# Patient Record
Sex: MALE | Race: WHITE | Employment: FULL TIME | URBAN - METROPOLITAN AREA
[De-identification: names, ages, dates, MRNs, and addresses within clinical notes are randomized per-mention and may not be internally consistent; named-entity substitution may affect disease eponyms.]

---

## 2017-09-18 ENCOUNTER — HOSPITAL ENCOUNTER (OUTPATIENT)
Dept: PREADMISSION TESTING | Age: 62
Discharge: HOME | End: 2017-09-18
Payer: COMMERCIAL

## 2017-09-18 VITALS
OXYGEN SATURATION: 96 % | WEIGHT: 190.04 LBS | HEIGHT: 68 IN | HEART RATE: 80 BPM | DIASTOLIC BLOOD PRESSURE: 70 MMHG | RESPIRATION RATE: 16 BRPM | SYSTOLIC BLOOD PRESSURE: 114 MMHG | BODY MASS INDEX: 28.8 KG/M2 | TEMPERATURE: 98.3 F

## 2017-09-18 LAB
ANION GAP SERPL CALC-SCNC: 6 MMOL/L (ref 5–15)
ATRIAL RATE: 75 BPM
BUN SERPL-MCNC: 8 MG/DL (ref 6–20)
BUN/CREAT SERPL: 10 (ref 12–20)
CALCIUM SERPL-MCNC: 9.3 MG/DL (ref 8.5–10.1)
CALCULATED P AXIS, ECG09: 10 DEGREES
CALCULATED R AXIS, ECG10: -23 DEGREES
CALCULATED T AXIS, ECG11: -8 DEGREES
CHLORIDE SERPL-SCNC: 100 MMOL/L (ref 97–108)
CO2 SERPL-SCNC: 30 MMOL/L (ref 21–32)
CREAT SERPL-MCNC: 0.83 MG/DL (ref 0.7–1.3)
DIAGNOSIS, 93000: NORMAL
GLUCOSE SERPL-MCNC: 118 MG/DL (ref 65–100)
P-R INTERVAL, ECG05: 146 MS
POTASSIUM SERPL-SCNC: 4.6 MMOL/L (ref 3.5–5.1)
Q-T INTERVAL, ECG07: 380 MS
QRS DURATION, ECG06: 92 MS
QTC CALCULATION (BEZET), ECG08: 424 MS
SODIUM SERPL-SCNC: 136 MMOL/L (ref 136–145)
VENTRICULAR RATE, ECG03: 75 BPM

## 2017-09-18 PROCEDURE — 36415 COLL VENOUS BLD VENIPUNCTURE: CPT | Performed by: ANESTHESIOLOGY

## 2017-09-18 PROCEDURE — 93005 ELECTROCARDIOGRAM TRACING: CPT

## 2017-09-18 PROCEDURE — 80048 BASIC METABOLIC PNL TOTAL CA: CPT | Performed by: ANESTHESIOLOGY

## 2017-09-18 RX ORDER — AMLODIPINE BESYLATE 5 MG/1
5 TABLET ORAL
COMMUNITY

## 2017-09-18 RX ORDER — METOPROLOL TARTRATE 25 MG/1
25 TABLET, FILM COATED ORAL 2 TIMES DAILY
COMMUNITY

## 2017-09-18 RX ORDER — CLOPIDOGREL BISULFATE 75 MG/1
75 TABLET ORAL
COMMUNITY

## 2017-09-18 RX ORDER — METFORMIN HYDROCHLORIDE 500 MG/1
500 TABLET, EXTENDED RELEASE ORAL 2 TIMES DAILY
COMMUNITY

## 2017-09-18 RX ORDER — ROSUVASTATIN CALCIUM 20 MG/1
20 TABLET, COATED ORAL
COMMUNITY

## 2017-09-18 NOTE — PERIOP NOTES
[facility]  PREOPERATIVE INSTRUCTIONS    Surgery Date:   9/22/2017     Surgery arrival time given by surgeon: NO  (If [facility] staff will call you between 3pm - 7pm the day before surgery with your arrival time. If your surgery is on a Monday, we will call you the preceding Friday. Please call [phone] after 7pm if you did not receive your arrival time.)  1. Report  to the 2nd Floor Admitting Desk on the day of your surgery. Bring your insurance card, photo identification, and any copayment (if applicable). 2. You must have a responsible adult to drive you home and stay with you the first 24 hours after surgery if you are going home the same day of your surgery. 3. Nothing to eat or drink after midnight the night before surgery. This means NO water, gum, mints, coffee, juice, etc.    4. MEDICATIONS TO TAKE THE MORNING OF SURGERY WITH A SIP OF WATER:Amlodipine, Metoprolol, Rosuvastatin. Hold your Metformin the day of surgery only. Contact your PCP to see what dose to use for Levemir the night before surgery. Contact your cardiologist asap to find out when to stop and restart your Plavix. 5. No alcoholic beverages 24 hours before and after your surgery. 6. If you are being admitted to the hospital,please leave personal belongings/luggage in your car until you have an assigned hospital room number. ( The hospital discharge time is 12 PM NOON. Your adult  should be at the hospital prior to the noon discharge time unless otherwise instructed.)   7. STOP Aspirin and/or any non-steroidal anti-inflammatory drugs (i.e. Ibuprofen, Naproxen, Advil, Aleve) as directed by your surgeon. You may take Tylenol. Stop herbal supplements 1 week prior to  surgery. 8. If you are currently taking Plavix, Coumadin,or any other blood-thinning/ anticoagulant medication contact your surgeon for instructions. 9. Wear comfortable clothes. Wear your glasses instead of contacts.  Please leave all money, jewelry and valuables at home. No make up, particularly mascara, the day of surgery. 10.  REMOVE ALL body piercings, rings,and jewelry and leave at home. Wear your hair loose or down, no pony-tails, buns, or any metal hair clips. 11. If you shower the morning of surgery, please do not apply any lotions, powders, or deodorants afterwards. Do not shave any body area within 24 hours of your surgery. 12. Please follow all instructions to avoid any potential surgical cancellation. 13. Should your physical condition change, (i.e. fever, cold, flu, etc.) please notify your surgeon as soon as possible. 14. It is important to be on time. If a situation occurs where you may be delayed, please call:  [phone] / [phone] on the day of surgery. 15. The [facility] staff can be reached at [phone]. 16. Special instructions: free  parking available 7-5  17. The patient was contacted  in person. He  verbalize  understanding of all instructions does not  need reinforcement.

## 2017-09-20 NOTE — PERIOP NOTES
Left VM at Dr. Bower [facility] office to request Plavix plan faxed to their office on 9/18/17 be faxed back to PATNatali RAMIREZ 9/22/17.

## 2017-09-21 ENCOUNTER — ANESTHESIA EVENT (OUTPATIENT)
Dept: SURGERY | Age: 62
End: 2017-09-21
Payer: COMMERCIAL

## 2017-09-21 NOTE — PERIOP NOTES
Orders for PAT and surgery were requested before the patient came through PAT and have not been received as of yet. Spoke with Warden Shepherd at Dr. Raya Hidalgo office today requesting orders for surgery.   DOS: 9/22/2017

## 2017-09-22 ENCOUNTER — HOSPITAL ENCOUNTER (OUTPATIENT)
Age: 62
Setting detail: OUTPATIENT SURGERY
Discharge: HOME | End: 2017-09-22
Attending: UROLOGY | Admitting: UROLOGY
Payer: COMMERCIAL

## 2017-09-22 ENCOUNTER — ANESTHESIA (OUTPATIENT)
Dept: SURGERY | Age: 62
End: 2017-09-22
Payer: COMMERCIAL

## 2017-09-22 VITALS
SYSTOLIC BLOOD PRESSURE: 119 MMHG | OXYGEN SATURATION: 95 % | WEIGHT: 198.41 LBS | TEMPERATURE: 98.2 F | HEART RATE: 86 BPM | HEIGHT: 68 IN | RESPIRATION RATE: 15 BRPM | BODY MASS INDEX: 30.07 KG/M2 | DIASTOLIC BLOOD PRESSURE: 60 MMHG

## 2017-09-22 PROBLEM — A63.0 PENILE VENEREAL WARTS: Status: ACTIVE | Noted: 2017-09-22

## 2017-09-22 LAB
GLUCOSE BLD STRIP.AUTO-MCNC: 120 MG/DL (ref 65–100)
SERVICE CMNT-IMP: ABNORMAL

## 2017-09-22 PROCEDURE — 77030011943: Performed by: UROLOGY

## 2017-09-22 PROCEDURE — 76210000050 HC AMBSU PH II REC 0.5 TO 1 HR: Performed by: UROLOGY

## 2017-09-22 PROCEDURE — 74011000250 HC RX REV CODE- 250: Performed by: UROLOGY

## 2017-09-22 PROCEDURE — 74011250636 HC RX REV CODE- 250/636

## 2017-09-22 PROCEDURE — 74011250636 HC RX REV CODE- 250/636: Performed by: ANESTHESIOLOGY

## 2017-09-22 PROCEDURE — 74011000250 HC RX REV CODE- 250

## 2017-09-22 PROCEDURE — 76060000062 HC AMB SURG ANES 1 TO 1.5 HR: Performed by: UROLOGY

## 2017-09-22 PROCEDURE — 77030027138 HC INCENT SPIROMETER -A

## 2017-09-22 PROCEDURE — 77030020143 HC AIRWY LARYN INTUB CGAS -A: Performed by: ANESTHESIOLOGY

## 2017-09-22 PROCEDURE — 82962 GLUCOSE BLOOD TEST: CPT

## 2017-09-22 PROCEDURE — 74011250636 HC RX REV CODE- 250/636: Performed by: UROLOGY

## 2017-09-22 PROCEDURE — 76030000019 HC AMB SURG 1 TO 1.5 HR INTENSV-TIER 1: Performed by: UROLOGY

## 2017-09-22 PROCEDURE — 77030018846 HC SOL IRR STRL H20 ICUM -A: Performed by: UROLOGY

## 2017-09-22 PROCEDURE — 77030020782 HC GWN BAIR PAWS FLX 3M -B

## 2017-09-22 RX ORDER — CEPHALEXIN 500 MG/1
500 CAPSULE ORAL 4 TIMES DAILY
Qty: 20 CAP | Refills: 0 | Status: SHIPPED | OUTPATIENT
Start: 2017-09-22 | End: 2017-09-27

## 2017-09-22 RX ORDER — SODIUM CHLORIDE 0.9 % (FLUSH) 0.9 %
5-10 SYRINGE (ML) INJECTION EVERY 8 HOURS
Status: DISCONTINUED | OUTPATIENT
Start: 2017-09-22 | End: 2017-09-22 | Stop reason: HOSPADM

## 2017-09-22 RX ORDER — SODIUM CHLORIDE 0.9 % (FLUSH) 0.9 %
5-10 SYRINGE (ML) INJECTION AS NEEDED
Status: DISCONTINUED | OUTPATIENT
Start: 2017-09-22 | End: 2017-09-22 | Stop reason: HOSPADM

## 2017-09-22 RX ORDER — PROPOFOL 10 MG/ML
INJECTION, EMULSION INTRAVENOUS AS NEEDED
Status: DISCONTINUED | OUTPATIENT
Start: 2017-09-22 | End: 2017-09-22 | Stop reason: HOSPADM

## 2017-09-22 RX ORDER — ONDANSETRON 2 MG/ML
INJECTION INTRAMUSCULAR; INTRAVENOUS AS NEEDED
Status: DISCONTINUED | OUTPATIENT
Start: 2017-09-22 | End: 2017-09-22 | Stop reason: HOSPADM

## 2017-09-22 RX ORDER — SODIUM CHLORIDE, SODIUM LACTATE, POTASSIUM CHLORIDE, CALCIUM CHLORIDE 600; 310; 30; 20 MG/100ML; MG/100ML; MG/100ML; MG/100ML
100 INJECTION, SOLUTION INTRAVENOUS CONTINUOUS
Status: DISCONTINUED | OUTPATIENT
Start: 2017-09-22 | End: 2017-09-22 | Stop reason: HOSPADM

## 2017-09-22 RX ORDER — HYDROMORPHONE HYDROCHLORIDE 1 MG/ML
.25-1 INJECTION, SOLUTION INTRAMUSCULAR; INTRAVENOUS; SUBCUTANEOUS
Status: DISCONTINUED | OUTPATIENT
Start: 2017-09-22 | End: 2017-09-22 | Stop reason: HOSPADM

## 2017-09-22 RX ORDER — LIDOCAINE HYDROCHLORIDE 10 MG/ML
0.1 INJECTION, SOLUTION EPIDURAL; INFILTRATION; INTRACAUDAL; PERINEURAL AS NEEDED
Status: DISCONTINUED | OUTPATIENT
Start: 2017-09-22 | End: 2017-09-22 | Stop reason: HOSPADM

## 2017-09-22 RX ORDER — CEFAZOLIN SODIUM IN 0.9 % NACL 2 G/50 ML
2 INTRAVENOUS SOLUTION, PIGGYBACK (ML) INTRAVENOUS ONCE
Status: COMPLETED | OUTPATIENT
Start: 2017-09-22 | End: 2017-09-22

## 2017-09-22 RX ORDER — OXYCODONE AND ACETAMINOPHEN 5; 325 MG/1; MG/1
1-2 TABLET ORAL
Qty: 40 TAB | Refills: 0 | Status: SHIPPED | OUTPATIENT
Start: 2017-09-22

## 2017-09-22 RX ORDER — ONDANSETRON 2 MG/ML
4 INJECTION INTRAMUSCULAR; INTRAVENOUS AS NEEDED
Status: DISCONTINUED | OUTPATIENT
Start: 2017-09-22 | End: 2017-09-22 | Stop reason: HOSPADM

## 2017-09-22 RX ORDER — MIDAZOLAM HYDROCHLORIDE 1 MG/ML
INJECTION, SOLUTION INTRAMUSCULAR; INTRAVENOUS AS NEEDED
Status: DISCONTINUED | OUTPATIENT
Start: 2017-09-22 | End: 2017-09-22 | Stop reason: HOSPADM

## 2017-09-22 RX ORDER — DIPHENHYDRAMINE HYDROCHLORIDE 50 MG/ML
12.5 INJECTION, SOLUTION INTRAMUSCULAR; INTRAVENOUS AS NEEDED
Status: DISCONTINUED | OUTPATIENT
Start: 2017-09-22 | End: 2017-09-22 | Stop reason: HOSPADM

## 2017-09-22 RX ORDER — FENTANYL CITRATE 50 UG/ML
INJECTION, SOLUTION INTRAMUSCULAR; INTRAVENOUS AS NEEDED
Status: DISCONTINUED | OUTPATIENT
Start: 2017-09-22 | End: 2017-09-22 | Stop reason: HOSPADM

## 2017-09-22 RX ORDER — BUPIVACAINE HYDROCHLORIDE 5 MG/ML
INJECTION, SOLUTION EPIDURAL; INTRACAUDAL AS NEEDED
Status: DISCONTINUED | OUTPATIENT
Start: 2017-09-22 | End: 2017-09-22 | Stop reason: HOSPADM

## 2017-09-22 RX ORDER — PROMETHAZINE HYDROCHLORIDE 12.5 MG/1
25 TABLET ORAL
Qty: 10 TAB | Refills: 0 | Status: SHIPPED | OUTPATIENT
Start: 2017-09-22 | End: 2017-10-02

## 2017-09-22 RX ORDER — SODIUM CHLORIDE, SODIUM LACTATE, POTASSIUM CHLORIDE, CALCIUM CHLORIDE 600; 310; 30; 20 MG/100ML; MG/100ML; MG/100ML; MG/100ML
125 INJECTION, SOLUTION INTRAVENOUS CONTINUOUS
Status: DISCONTINUED | OUTPATIENT
Start: 2017-09-22 | End: 2017-09-22 | Stop reason: HOSPADM

## 2017-09-22 RX ORDER — BACITRACIN ZINC 500 UNIT/G
OINTMENT (GRAM) TOPICAL AS NEEDED
Status: DISCONTINUED | OUTPATIENT
Start: 2017-09-22 | End: 2017-09-22 | Stop reason: HOSPADM

## 2017-09-22 RX ADMIN — MIDAZOLAM HYDROCHLORIDE 2 MG: 1 INJECTION, SOLUTION INTRAMUSCULAR; INTRAVENOUS at 08:14

## 2017-09-22 RX ADMIN — PROPOFOL 170 MG: 10 INJECTION, EMULSION INTRAVENOUS at 08:21

## 2017-09-22 RX ADMIN — CEFAZOLIN 2 G: 1 INJECTION, POWDER, FOR SOLUTION INTRAMUSCULAR; INTRAVENOUS; PARENTERAL at 08:19

## 2017-09-22 RX ADMIN — FENTANYL CITRATE 25 MCG: 50 INJECTION, SOLUTION INTRAMUSCULAR; INTRAVENOUS at 08:53

## 2017-09-22 RX ADMIN — PROPOFOL 30 MG: 10 INJECTION, EMULSION INTRAVENOUS at 08:54

## 2017-09-22 RX ADMIN — SODIUM CHLORIDE, SODIUM LACTATE, POTASSIUM CHLORIDE, AND CALCIUM CHLORIDE 100 ML/HR: 600; 310; 30; 20 INJECTION, SOLUTION INTRAVENOUS at 06:36

## 2017-09-22 RX ADMIN — SODIUM CHLORIDE, SODIUM LACTATE, POTASSIUM CHLORIDE, AND CALCIUM CHLORIDE: 600; 310; 30; 20 INJECTION, SOLUTION INTRAVENOUS at 09:09

## 2017-09-22 RX ADMIN — FENTANYL CITRATE 50 MCG: 50 INJECTION, SOLUTION INTRAMUSCULAR; INTRAVENOUS at 08:14

## 2017-09-22 RX ADMIN — FENTANYL CITRATE 25 MCG: 50 INJECTION, SOLUTION INTRAMUSCULAR; INTRAVENOUS at 08:54

## 2017-09-22 RX ADMIN — ONDANSETRON 4 MG: 2 INJECTION INTRAMUSCULAR; INTRAVENOUS at 09:13

## 2017-09-22 NOTE — DISCHARGE INSTRUCTIONS
[facility]  urology  [phone] [phone]  [place]  f  [phone]    PATIENT INSTRUCTIONS:    After general anesthesia or intravenous sedation, for 24 hours or while taking prescription Narcotics:  · Limit your activities  · Do not drive and operate hazardous machinery  · Do not make important personal or business decisions  · Do not drink alcoholic beverages      Please contact [facility] at [phone] if:  · Temperature over 101  · Vomiting / Unable to tolerate liquids  · Severe pain not responsive to pain medications  · Unable to urinate for 6-8 hours  · Problems with wound - bleeding, redness, discharge    Apply Neosporin to wounds at least 2 x / day  May shower in the AM            DISCHARGE SUMMARY from Your Nurse    PATIENT INSTRUCTIONS:    AFTER ANESTHESIA & SEDATION, and WHILE TAKING PAIN MEDICINE  After general anesthesia / intravenous sedation and the 24 hours following, and/or while taking prescription Opiates:  · Limit your activities  · Do not drive and operate hazardous machinery until you have been of all narcotics and sedatives for over 24 hours  · Do not make important personal or business decisions  · Do  not drink alcoholic beverages  · If you have not urinated within 8 hours after discharge, please contact your surgeon on call. SIGNS OF INFECTION  Report the following Signs of Infection or General Problems after surgery to your surgeon:  · Excessive pain, swelling, redness or odor of or around the surgical area  · Temperature over 101; Temperature over 100 if on medications that affect your ability to fight infections  · Nausea and vomiting lasting longer than 4 hours or if unable to take medications  · Any signs of decreased circulation or nerve impairment to extremity: change in color, persistent  numbness, tingling, coldness or increase pain  · If you have any questions.       COUGH AND DEEP BREATHE  Breathing deep and coughing are very important exercises to do after surgery. Deep breathing and coughing open the little air tubes and air sacks in your lungs. You take deep breaths every day. You may not even notice - it is just something you do when you sigh or yawn. It is a natural exercise you do to keep these air passages open. After surgery, take deep breaths and cough, on purpose. Coughing and deep breathing help prevent bronchitis and pneumonia after surgery. If you had chest or belly surgery, use a pillow as a \"hug patience\" and hold it tightly to your chest or belly when you cough. DIRECTIONS:  1. Take 10 to 15 slow deep breaths every hour while awake. 2. Breathe in deeply, and hold it for 2 seconds. 3. Exhale slowly through puckered lips, like blowing up a balloon. 4. After every 4th or 5th deep breath, hug your pillow to your chest or belly and give a hard, deep cough. Yes, it will probably hurt. But doing this exercise is very important part of healing after surgery. Take your pain medicine to help you do this exercise without too much pain. ANKLE PUMPS    Ankle pumps increase the circulation of oxygenated blood to your lower extremities and decrease your risk for circulation problems such as blood clots. They also stretch the muscles, tendons and ligaments in your foot and ankle, and prevent joint contracture in the ankle and foot, especially after surgeries on the legs. It is important to do ankle pump exercises regularly after surgery because immobility increases your risk for developing a blood clot. Your doctor may also have you take an Aspirin for the next few days as well. If your doctor did not ask you to take an Aspirin, consult with him before starting Aspirin therapy on your own. The exercise is quite simple. · Slowly point your foot forward, feeling the muscles on the top of your lower leg stretch, and hold this position for 5 seconds.                   · Next, pull your foot back toward you as far as possible, stretching the calf muscles, and hold that position for 5 seconds. · Repeat with the other foot. · Perform 10 repetitions every hour while awake for both ankles if possible (down and then up with the foot once is one repetition). You should feel gentle stretching of the muscles in your lower leg when doing this exercise. If you feel pain, or your range of motion is limited, don't push too hard. Only go the limit your joint and muscles will let you go. If you have increasing pain, progressively worsening leg warmth or swelling, STOP the exercise and call your doctor. Other Wound Care information:                        Below is information on the medication(s) your doctor is prescribing for you: The maximum daily dose of acetaminophen was discussed with the patient. He was encouraged not to exceed 3,000 mg of acetaminophen during a 24 hour period and was asked to keep in mind that acetaminophen can also be found in many over-the-counter cold medications as well as narcotics that may be given for pain. The patient expresses understanding of these issues and questions were answered. 4 THINGS ABOUT PAIN MEDICINE I ALWAYS TALK ABOUT:  There are 4 side effects I always talk about for pain medications. 1. They make you sleepy and drowsy. Do not drive a car or operate machines while taking pain medication. Do not make any major decisions. Take a nap. Relax. Let your body recover from the affects of anesthesia and surgery. 2. Some people have quite a problem with itching and. ..  3. Nausea or vomiting. I mention these together because research tends to suggest there is a gene-related issue. While some have a hard time with these problems, others do not. These are expected and know side effects. Over-the-counter Benadryl® (the drug name is diphenhydramine) can help with the itching.   Your doctor may also have given you some medicine for nausea. IF HE DID NOT, CALL HIM/HER. 4. Last but not least is the problem of constipation (not serenity able to have a bowel movement - poop.)  All pain medicine can slow down the movement of food through the gut. The slower it goes, the worse it can be. Frankly, this means hard poop adding insult to the injury of surgery. And if you had tummy surgery, like having your gall bladder removed or a hernia repair, YOU DO NOT WANT THIS PROBLEM. There are 4 things I recommend. · Drink lots of fluids. For healthy people with no heart problems, this means at least 64 ounces of liquids or more per day. For example, a Big Gulp® from [employer] is 32 ounces. So you need to drink at least 2  Big Gulp®'s of fluids every day. If you have heart problems you may not be able to do this. Talk to your doctor about what you should do to prevent constipation. · Drinking fruit juice like apple, pear, or prune juice gives you extra \"BANG\" for your beverage. These drinks are high in natural fiber. If you are a diabetic, drink sugar-free fluids with fiber additives (see next 2 points.)  Avoid drinking extra fruit juice unless this is a regular part of your diet plan. · Eat extra fresh fruits and vegetables. · Add extra fiber-products. Fiber products like Metamucil®, Citrucel®, Miralax® or Benefiber® can help. These products are over-the-counter and you do not need a prescription from your doctor. If you have followed these recommendations and still have some difficulty having a good poop, take and over-the-counter stimulant like Dulcolax® (biscodyl)  or Senokot® (senna concentrate). These may help get things moving. Nicholas Wing MEDICATION AND   SIDE EFFECT GUIDE    The Boone Matos MEDICATION AND SIDE EFFECT GUIDE was provided to the PATIENT AND CARE PROVIDER.   Information provided includes instruction about drug purpose and common side effects for the following medications: · PERCOCET  · PHENERGAN  · KEFLEX        Medication information added to discharge record on September 22, 2017 at 9:24 AM.      Some information we wish all of our patients to be familiar with and General Information for Healthy Lifestyle choices:    · Make a list of your current medications with your Primary Care Provider. · Update this list whenever your medications are discontinued, doses are changed, or new medications (including over-the-counter products like ibuprofen, vitamins, or herbal remedies) are added. · Carry medication information at all times in case of emergency situations      No smoking / No tobacco products / Avoid exposure to second hand smoke    Surgeon General's Warning:  Quitting smoking now greatly reduces serious risk to your health. Obesity, smoking, and sedentary lifestyle greatly increases your risk for illness. A healthy diet, regular physical exercise & weight monitoring are important for maintaining a healthy lifestyle. A Note About Congestive Heart Failure: You may be retaining fluid if you have a history of heart failure or if you experience any of the following symptoms:      · Weight gain of 3 pounds or more overnight or 5 pounds in a week  · Increased swelling in our hands or feet  · Shortness of breath while lying flat in bed      Please call your doctor as soon as you notice any of these symptoms; do not wait until your next office visit. A Note About Strokes:  Recognize signs and symptoms of STROKE. The simple mnemonic, F.A.S.T., can help you remember signs of a stroke and what to do if you suspect a stroke is occuring to you or someone you are with:    F - Face looks uneven  A - Arms unable to move, or move evenly  S - Speech is slurred or non-existent  T - Time - CALL 911 as soon as signs and symptoms begin - DO NOT go to bed or wait to see if you get better - TIME IS BRAIN.     Warning Signs of HEART ATTACK   Call 911 if you have these symptoms:     Chest discomfort. Most heart attacks involve discomfort in the center of the chest that lasts more than a few minutes, or that goes away and comes back. It can feel like uncomfortable pressure, squeezing, fullness, or pain.  Discomfort in other areas of the upper body. Symptoms can include pain or discomfort in one or both arms, the back, neck, jaw, or stomach.  Shortness of breath with or without chest discomfort.  Other signs may include breaking out in a cold sweat, nausea, or lightheadedness. Don't wait more than five minutes to call 911 - MINUTES MATTER! Fast action can save your life. Calling 911 is almost always the fastest way to get lifesaving treatment. Emergency Medical Services staff can begin treatment when they arrive -- up to an hour sooner than if someone gets to the hospital by car. AT THE COMPLETION OF DISCHARGE INSTRUCTION REVIEW, WE VERIFY:  The discharge information has been reviewed with the patient and caregiver. Questions have been asked and answered meeting patient and caregiver expectations. The patient and caregiver verbalized understanding. Your discharge nurse was Donna GONZALEZ, RN-BC       Board Certified - Pain Management      Other information found in your discharge envelope:  [x]     PRESCRIPTIONS     [] Sent electronically to your pharmacy  []     PHYSICAL THERAPY PRESCRIPTION  []     APPOINTMENT CARDS  []     Regional Anesthesia Pamphlet for block or block with On-Q Catheter from Anesthesia  Service  []     Medical device information sheets/pamphlets from their    []     School/work excuse note. []     /parent work excuse note. The following personal items collected during your admission for safe keeping are returned to you:     Dental Appliance: Dental Appliances: None  Vi trevor:    Hearing Aid:    Jewelry:    Clothing: Clothing:  (denvinay valua les. clothing to locker)  Other Valuables:    Valuables sent to safe:

## 2017-09-22 NOTE — PERIOP NOTES
PACU IN REPORT FROM ANESTHESIA    Verbal report received from   [address] [place] [address]  following General for Procedure(s) (LRB):  CO2 LASER FULGERATION OF PENILE WARTS   (N/A) by  Tiffany Dumont MD.    Note the anesthesia record for medications given intraoperatively. Brief Initial Visual Assessment:    Patient is an: [] Infant(1-12mo)   [] Toddler(1-3yr)     [] (3-5yr)                     [] School-Age(5-13yr) [] Adolescent(13-18yr)  [x] Adult(18-65yr)                         [] Geriatric Adult(>65yr). Patient is:    [] SEDATED      [x] DROWSY      [] ALERT          [] ANXIOUS [x] CALM & COOPERATIVE     Alert and Oriented x 3. Airway is:     [x] Patent                          [] Near-obstructed                          [] Obstructed          [] Improved with head/airway repositioning. Airway Adjuncts Present: [] Oral Airway       [] Nasal Trumpet       [] ETT     Respiratory  [x] Even    [x] Non-labored.     [] Labored   [] Shallow  pattern is:    [] VENT and/or respiratory assistance being provided. Skin is:     [x] Pink [] Pale       [x] Warm [] Cool [] Cold   [x] Dry [] Moist [] Diaphoretic     Membranes   [x] Pink [] Pale       [x] Moist [] Dry [] Crusty     Patient is in:  [x] No Acute Discomfort. 0 /10 pain Scale  [] A.N.V.   [] Moderate Discomfort.      [] V.A.S. [] Acute Discomfort. [x] Verbal Numeric   Note E-MAR for medications administered.  [] F.L.A.C.C. Note assessments documented in flowsheets; any assessment variants to be found in comments or narrative perioperative nurse notes.        Post-anesthesia care now assumed by [facility] BSN, RN-BC

## 2017-09-22 NOTE — ANESTHESIA PREPROCEDURE EVALUATION
Anesthetic History   No history of anesthetic complications            Review of Systems / Medical History  Patient summary reviewed and pertinent labs reviewed    Pulmonary        Sleep apnea: CPAP  Smoker         Neuro/Psych   Within defined limits           Cardiovascular    Hypertension          Past MI, CAD and cardiac stents    Exercise tolerance: >4 METS     GI/Hepatic/Renal  Within defined limits              Endo/Other    Diabetes: type 2, using insulin         Other Findings              Physical Exam    Airway  Mallampati: III  TM Distance: 4 - 6 cm  Neck ROM: normal range of motion   Mouth opening: Normal     Cardiovascular    Rhythm: regular  Rate: normal         Dental    Dentition: Caps/crowns     Pulmonary  Breath sounds clear to auscultation               Abdominal         Other Findings            Anesthetic Plan    ASA: 3  Anesthesia type: general            Anesthetic plan and risks discussed with: Patient

## 2017-09-22 NOTE — ANESTHESIA POSTPROCEDURE EVALUATION
Post-Anesthesia Evaluation and Assessment    Patient: Ashlie Smith MRN: [id number]  SSN: [id number]    YOB: 1955  Age: 60 y.o. Sex: male       Cardiovascular Function/Vital Signs  Visit Vitals    /60    Pulse 86    Temp 36.8 °C (98.2 °F)    Resp 15    Ht 5' 8\" (1.727 m)    Wt 90 kg (198 lb 6.6 oz)    SpO2 95%    BMI 30.17 kg/m2       Patient is status post general anesthesia for Procedure(s):  CO2 LASER FULGERATION OF PENILE WARTS  . Nausea/Vomiting: None    Postoperative hydration reviewed and adequate. Pain:  Pain Scale 1: Numeric (0 - 10) (09/22/17 0955)  Pain Intensity 1: 0 (09/22/17 0955)   Managed    Neurological Status:   Neuro (WDL): Within Defined Limits (09/22/17 0955)   At baseline    Mental Status and Level of Consciousness: Arousable    Pulmonary Status:   O2 Device: Room air (09/22/17 7344)   Adequate oxygenation and airway patent    Complications related to anesthesia: None    Post-anesthesia assessment completed.  No concerns    Signed By: Michelle Mondragon MD     September 22, 2017

## 2017-09-22 NOTE — PROGRESS NOTES
POST ANESTHESIA CARE    DISCHARGE / TRANSFER NOTE    Víctor Robertson was   discharged     via    wheel chair     to  private vehicle    . Patient was escorted by  spouse   . Patient verbalized   appreciation and was very pleased with care received  throughout their stay. Patient was discharged in     pleasant mood   . Pain at discharge/transfer was    1 /10. Discharge, medication and follow-up instructions were verbalized as understood prior to discharge  (if applicable for same-day procedures being discharged.)    All personal belongings have been returned to patient, and patient/family verbally confirm receiving belongings as all present. John MERAZN RN-BC Clin. IV

## 2017-09-22 NOTE — BRIEF OP NOTE
BRIEF OPERATIVE NOTE    Date of Procedure: 9/22/2017   Preoperative Diagnosis: PENILE WARTS  Postoperative Diagnosis: PENILE WARTS    Procedure(s):  CO2 LASER FULGERATION OF PENILE WARTS    Surgeon(s) and Role:     * Avila Moore MD - Primary         Assistant Staff:       Surgical Staff:  Circ-1: Yves Davis RN  Scrub [facility]  Event Time In   Incision Start 2292   Incision Close 0910     Anesthesia: General   Estimated Blood Loss: < 50cc  Specimens: * No specimens in log *   Findings: extensive condyloma involving entire shaft   Complications: none  Implants: * No implants in log *

## 2017-09-22 NOTE — IP AVS SNAPSHOT
[address] [place] [address] 
[phone] Patient: Oly Hanks MRN: [id number] OLT:[id number] You are allergic to the following No active allergies Recent Documentation Height Weight BMI Smoking Status 1.727 m 90 kg 30.17 kg/m2 Current Every Day Smoker Emergency Contacts Name Discharge Info Relation Home Work Mobile [facility] DISCHARGE CAREGIVER [3] Spouse [3] [phone] About your hospitalization You were admitted on:  September 22, 2017 You last received care in the:  [facility] 2 AMB SURG UNIT You were discharged on:  September 22, 2017 Unit phone number:  [phone] Why you were hospitalized Your primary diagnosis was:  Penile Venereal Warts Providers Seen During Your Hospitalizations Provider Role Specialty Primary office phone Tatyana Manning MD Attending Provider Urology [phone] Your Primary Care Physician (PCP) Primary Care Physician Office Phone Office Fax Mo Easley [phone] Follow-up Information Follow up With Details Comments Contact Info Tatyana Manning MD Schedule an appointment as soon as possible for a visit in 2 week(s)  [address] [facility] 
[phone] Sj Price MD   [address] [facility] [address] [facility] [address] 
[phone] Current Discharge Medication List  
  
START taking these medications Dose & Instructions Dispensing Information Comments Morning Noon Evening Bedtime  
 cephALEXin 500 mg capsule Commonly known as:  Lorenda Darting Your last dose was: Your next dose is:    
   
   
 Dose:  500 mg Take 1 Cap by mouth four (4) times daily for 5 days. Quantity:  20 Cap Refills:  0  
     
   
   
   
  
 oxyCODONE-acetaminophen 5-325 mg per tablet Commonly known as:  PERCOCET  
   
 Your last dose was: Your next dose is:    
   
   
 Dose:  1-2 Tab Take 1-2 Tabs by mouth every four (4) hours as needed for Pain. Max Daily Amount: 12 Tabs. Quantity:  40 Tab Refills:  0  
     
   
   
   
  
 promethazine 12.5 mg tablet Commonly known as:  PHENERGAN Your last dose was: Your next dose is:    
   
   
 Dose:  25 mg Take 2 Tabs by mouth every six (6) hours as needed for Nausea for up to 10 days. Quantity:  10 Tab Refills:  0 CONTINUE these medications which have NOT CHANGED Dose & Instructions Dispensing Information Comments Morning Noon Evening Bedtime  
 amLODIPine 5 mg tablet Commonly known as:  Unknown [place] Your last dose was: Your next dose is:    
   
   
 Dose:  5 mg Take 5 mg by mouth every morning. Refills:  0 BYDUREON 2 mg Serr Generic drug:  exenatide microspheres Your last dose was: Your next dose is:    
   
   
 Dose:  2 mg  
2 mg by SubCUTAneous route every seven (7) days. Refills:  0  
     
   
   
   
  
 clopidogrel 75 mg Tab Commonly known as:  PLAVIX Your last dose was: Your next dose is:    
   
   
 Dose:  75 mg Take 75 mg by mouth every morning. Refills:  0 LEVEMIR FLEXTOUCH 100 unit/mL (3 mL) Inpn Generic drug:  insulin detemir Your last dose was: Your next dose is:    
   
   
 Dose:  60 Units 60 Units by SubCUTAneous route nightly. Refills:  0  
     
   
   
   
  
 metFORMIN  mg tablet Commonly known as:  GLUCOPHAGE XR Your last dose was: Your next dose is:    
   
   
 Dose:  500 mg Take 500 mg by mouth two (2) times a day. Refills:  0  
     
   
   
   
  
 metoprolol tartrate 25 mg tablet Commonly known as:  LOPRESSOR Your last dose was: Your next dose is:    
   
   
 Dose:  25 mg Take 25 mg by mouth two (2) times a day. Refills:  0  
     
   
   
   
  
 rosuvastatin 20 mg tablet Commonly known as:  CRESTOR Your last dose was: Your next dose is:    
   
   
 Dose:  20 mg Take 20 mg by mouth every morning. Refills:  0 Where to Get Your Medications Information on where to get these meds will be given to you by the nurse or doctor. ! Ask your nurse or doctor about these medications  
  cephALEXin 500 mg capsule  
 oxyCODONE-acetaminophen 5-325 mg per tablet  
 promethazine 12.5 mg tablet Discharge Instructions   
  
[place] 
urology [address] [facility] [address] [phone] 
[facility] 6[address], [address] [facility]  f  [phone] PATIENT INSTRUCTIONS: 
 
After general anesthesia or intravenous sedation, for 24 hours or while taking prescription Narcotics: · Limit your activities · Do not drive and operate hazardous machinery · Do not make important personal or business decisions · Do not drink alcoholic beverages Please contact [facility] at [phone] if: · Temperature over 101 · Vomiting / Unable to tolerate liquids · Severe pain not responsive to pain medications · Unable to urinate for 6-8 hours · Problems with wound - bleeding, redness, discharge Apply Neosporin to wounds at least 2 x / day May shower in the AM 
 
 
 
 
 
DISCHARGE SUMMARY from Your Nurse PATIENT INSTRUCTIONS: 
 
AFTER ANESTHESIA & SEDATION, and WHILE TAKING PAIN MEDICINE After general anesthesia / intravenous sedation and the 24 hours following, and/or while taking prescription Opiates: · Limit your activities · Do not drive and operate hazardous machinery until you have been of all narcotics and sedatives for over 24 hours · Do not make important personal or business decisions · Do  not drink alcoholic beverages · If you have not urinated within 8 hours after discharge, please contact your surgeon on call.  
 
 
SIGNS OF INFECTION 
 Report the following Signs of Infection or General Problems after surgery to your surgeon: 
· Excessive pain, swelling, redness or odor of or around the surgical area · Temperature over 101; Temperature over 100 if on medications that affect your ability to fight infections · Nausea and vomiting lasting longer than 4 hours or if unable to take medications · Any signs of decreased circulation or nerve impairment to extremity: change in color, persistent  numbness, tingling, coldness or increase pain · If you have any questions. [address] Breathing deep and coughing are very important exercises to do after surgery. Deep breathing and coughing open the little air tubes and air sacks in your lungs. You take deep breaths every day. You may not even notice - it is just something you do when you sigh or yawn. It is a natural exercise you do to keep these air passages open. After surgery, take deep breaths and cough, on purpose. Coughing and deep breathing help prevent bronchitis and pneumonia after surgery. If you had chest or belly surgery, use a pillow as a \"hug patience\" and hold it tightly to your chest or belly when you cough. DIRECTIONS: 
1. Take 10 to 15 slow deep breaths every hour while awake. 2. Breathe in deeply, and hold it for 2 seconds. 3. Exhale slowly through puckered lips, like blowing up a balloon. 4. After every 4th or 5th deep breath, hug your pillow to your chest or belly and give a hard, deep cough. Yes, it will probably hurt. But doing this exercise is very important part of healing after surgery. Take your pain medicine to help you do this exercise without too much pain. ANKLE PUMPS Ankle pumps increase the circulation of oxygenated blood to your lower extremities and decrease your risk for circulation problems such as blood clots.  They also stretch the muscles, tendons and ligaments in your foot and ankle, and prevent joint contracture in the ankle and foot, especially after surgeries on the legs. It is important to do ankle pump exercises regularly after surgery because immobility increases your risk for developing a blood clot. Your doctor may also have you take an Aspirin for the next few days as well. If your doctor did not ask you to take an Aspirin, consult with him before starting Aspirin therapy on your own. The exercise is quite simple. · Slowly point your foot forward, feeling the muscles on the top of your lower leg stretch, and hold this position for 5 seconds. · Next, pull your foot back toward you as far as possible, stretching the calf muscles, and hold that position for 5 seconds. · Repeat with the other foot. · Perform 10 repetitions every hour while awake for both ankles if possible (down and then up with the foot once is one repetition). You should feel gentle stretching of the muscles in your lower leg when doing this exercise. If you feel pain, or your range of motion is limited, don't push too hard. Only go the limit your joint and muscles will let you go. If you have increasing pain, progressively worsening leg warmth or swelling, STOP the exercise and call your doctor. Other Wound Care information: 
 
 
 
               
Below is information on the medication(s) your doctor is prescribing for you: The maximum daily dose of acetaminophen was discussed with the patient. He was encouraged not to exceed 3,000 mg of acetaminophen during a 24 hour period and was asked to keep in mind that acetaminophen can also be found in many over-the-counter cold medications as well as narcotics that may be given for pain. The patient expresses understanding of these issues and questions were answered. 4 THINGS ABOUT PAIN MEDICINE I ALWAYS TALK ABOUT: 
There are 4 side effects I always talk about for pain medications. 1. They make you sleepy and drowsy. Do not drive a car or operate machines while taking pain medication. Do not make any major decisions. Take a nap. Relax. Let your body recover from the affects of anesthesia and surgery. 2. Some people have quite a problem with itching and. .. 3. Nausea or vomiting. I mention these together because research tends to suggest there is a gene-related issue. While some have a hard time with these problems, others do not. These are expected and know side effects. Over-the-counter Benadryl® (the drug name is diphenhydramine) can help with the itching. Your doctor may also have given you some medicine for nausea. IF HE DID NOT, CALL HIM/HER. 4. Last but not least is the problem of constipation (not serenity able to have a bowel movement - poop.)  All pain medicine can slow down the movement of food through the gut. The slower it goes, the worse it can be. Frankly, this means hard poop adding insult to the injury of surgery. And if you had tummy surgery, like having your gall bladder removed or a hernia repair, YOU DO NOT WANT THIS PROBLEM. There are 4 things I recommend. · Drink lots of fluids. For healthy people with no heart problems, this means at least 64 ounces of liquids or more per day. For example, a Big Gulp® from [employer] is 32 ounces. So you need to drink at least 2  Big Gulp®'s of fluids every day. If you have heart problems you may not be able to do this. Talk to your doctor about what you should do to prevent constipation. · Drinking fruit juice like apple, pear, or prune juice gives you extra \"BANG\" for your beverage. These drinks are high in natural fiber. If you are a diabetic, drink sugar-free fluids with fiber additives (see next 2 points.)  Avoid drinking extra fruit juice unless this is a regular part of your diet plan. · Eat extra fresh fruits and vegetables. · Add extra fiber-products.   Fiber products like Lina Valentin, Miralax® or Benefiber® can help. These products are over-the-counter and you do not need a prescription from your doctor. If you have followed these recommendations and still have some difficulty having a good poop, take and over-the-counter stimulant like Dulcolax® (biscodyl)  or Senokot® (senna concentrate). These may help get things moving. [address] EFFECT GUIDE The [address] EFFECT GUIDE was provided to the PATIENT AND CARE PROVIDER. Information provided includes instruction about drug purpose and common side effects for the following medications:  
· PERCOCET · PHENERGAN 
· Moy Kleine Medication information added to discharge record on September 22, 2017 at 9:24 AM. Some information we wish all of our patients to be familiar with and General Information for Healthy Lifestyle choices: · Make a list of your current medications with your Primary Care Provider. · Update this list whenever your medications are discontinued, doses are changed, or new medications (including over-the-counter products like ibuprofen, vitamins, or herbal remedies) are added. · Carry medication information at all times in case of emergency situations No smoking / No tobacco products / Avoid exposure to second hand smoke Surgeon General's Warning:  Quitting smoking now greatly reduces serious risk to your health. Obesity, smoking, and sedentary lifestyle greatly increases your risk for illness. A healthy diet, regular physical exercise & weight monitoring are important for maintaining a healthy lifestyle. A Note About Congestive Heart Failure: You may be retaining fluid if you have a history of heart failure or if you experience any of the following symptoms:   
 
· Weight gain of 3 pounds or more overnight or 5 pounds in a week · Increased swelling in our hands or feet · Shortness of breath while lying flat in bed Please call your doctor as soon as you notice any of these symptoms; do not wait until your next office visit. A Note About Strokes: 
Recognize signs and symptoms of STROKE. The simple mnemonic, F.A.S.T., can help you remember signs of a stroke and what to do if you suspect a stroke is occuring to you or someone you are with: 
 
F - Face looks uneven A - Arms unable to move, or move evenly S - Speech is slurred or non-existent T - Time - CALL 911 as soon as signs and symptoms begin - DO NOT go to bed or wait to see if you get better - TIME IS BRAIN. Warning Signs of HEART ATTACK Call 911 if you have these symptoms: 
 
? Chest discomfort. Most heart attacks involve discomfort in the center of the chest that lasts more than a few minutes, or that goes away and comes back. It can feel like uncomfortable pressure, squeezing, fullness, or pain. ? Discomfort in other areas of the upper body. Symptoms can include pain or discomfort in one or both arms, the back, neck, jaw, or stomach. ? Shortness of breath with or without chest discomfort. ? Other signs may include breaking out in a cold sweat, nausea, or lightheadedness. Don't wait more than five minutes to call [address]! Fast action can save your life. Calling 911 is almost always the fastest way to get lifesaving treatment. Emergency Medical Services staff can begin treatment when they arrive  up to an hour sooner than if someone gets to the hospital by car. AT THE COMPLETION OF DISCHARGE INSTRUCTION REVIEW, WE VERIFY: 
The discharge information has been reviewed with the patient and caregiver. Questions have been asked and answered meeting patient and caregiver expectations. The patient and caregiver verbalized understanding. Your discharge nurse was Aureliano MERAZN, RN-BC Board Certified - Pain Management Other information found in your discharge envelope: PRESCRIPTIONS      Sent electronically to your pharmacy PHYSICAL THERAPY PRESCRIPTION 
     APPOINTMENT CARDS [facility] Pamphlet for block or block with On-Q Catheter from Anesthesia  Service Medical device information sheets/pamphlets from their  School/work excuse note. /parent work excuse note. The following personal items collected during your admission for safe keeping are returned to you:  
 
Dental Appliance: Dental Appliances: None Vi trevor:   
Hearing Aid:   
Jewelry:   
Clothing: Clothing:  (denies valua les. clothing to locker) Other Valuables:   
Valuables sent to safe:   
 
 
Discharge Orders None Introducing [facility]! Akanksha Garcia introduces [employer] patient portal. Now you can access parts of your medical record, email your doctor's office, and request medication refills online. 1. In your internet browser, go to [email or web address] 2. Click on the First Time User? Click Here link in the Sign In box. You will see the New Member Sign Up page. 3. Enter your [employer] Access Code exactly as it appears below. You will not need to use this code after youve completed the sign-up process. If you do not sign up before the expiration date, you must request a new code. · [employer] Access Code: Yves [email or web address] Expires: 12/17/2017  7:48 AM 
 
4. Enter the last four digits of your Social Security Number (xxxx) and Date of Birth (mm/dd/yyyy) as indicated and click Submit. You will be taken to the next sign-up page. 5. Create a [employer] ID. This will be your [employer] login ID and cannot be changed, so think of one that is secure and easy to remember. 6. Create a [employer] password. You can change your password at any time. 7. Enter your Password Reset Question and Answer. This can be used at a later time if you forget your password. 8. Enter your e-mail address.  You will receive e-mail notification when new information is available in [employer]. 9. Click Sign Up. You can now view and download portions of your medical record. 10. Click the Download Summary menu link to download a portable copy of your medical information. If you have questions, please visit the Frequently Asked Questions section of the [employer] website. Remember, [employer] is NOT to be used for urgent needs. For medical emergencies, dial 911. Now available from your iPhone and Android! General Information Please provide this summary of care documentation to your next provider. Patient Signature:  ____________________________________________________________ Date:  ____________________________________________________________  
  
[facility] Provider Signature:  ____________________________________________________________ Date:  ____________________________________________________________

## 2017-09-25 NOTE — OP NOTES
[facility]   [address] [facility], [address]   OP NOTE       Name:  Kallie Ventura   MR#:  [id number]   :  1955   Account #:  [de-identified]    Surgery Date:  2017   Date of Adm:  2017       DATE OF OPERATION: 2017. PREOPERATIVE DIAGNOSIS: Extensive penile warts. POSTOPERATIVE DIAGNOSIS: Extensive penile warts. PROCEDURES PERFORMED: CO2 laser vaporization of extensive   penile warts. SURGEON: Ernie Jimenez MD.    ANESTHESIA: General using LMA. SPECIMENS: None. COMPLICATIONS: None. ESTIMATED BLOOD LOSS: None. DRAINS: None. INDICATION FOR PROCEDURE: The patient is a very pleasant 60  year-old white male with a history of extensive penile condyloma who   has undergone previous CO2 laser vaporization for these. He now   presents for repeat laser vaporization. Of note, he has condyloma   involving at least 50% of the skin surface area of the penile shaft,   extending 3/4 of the way around the shaft itself. There is no evidence   of any of these undergoing any malignant change. INFORMED CONSENT: After detailed discussion of the procedure, his   options, and its pros and cons, the patient has consented to proceed   with laser vaporization of these condyloma. DESCRIPTION OF PROCEDURE: The patient was taken to the   operating room and positively identified. He was placed on the   operating room table in the supine position and general anesthesia   was obtained using an LMA. His genitalia were sterilely prepped and   draped in the usual fashion. He received IV antibiotics according to   protocol. Using a CO2 laser, vaporization of the condyloma proceeded initially   on the patient's left shaft. This was followed on the ventral aspect and   then further on the patient's right shaft. Notably, there was no   extensive condyloma on the dorsal aspect of the penis.  All the lesions   were thoroughly ablated with care taken not to penetrate too deep into   the skin, but also to ensure complete removal of the condyloma. Appropriate mass and ventilation with suctioning of the smoke was   performed throughout the case to ensure safety for the patient and   staff. At the end of the case, all of the lesions had been thoroughly   vaporized. There were no residual lesions noted. There was excellent   hemostasis. Neosporin was applied to all of the wounds extensively   and a Xeroform and Gabriele dressing was applied. The patient tolerated the procedure well, was reversed from   anesthesia, extubated in the operating room, and transferred to the   recovery room satisfactorily.         MD Giselle Romero / Michael Mullins   D:  09/24/2017   20:50   T:  09/25/2017   03:44   Job #:  [id number]

## 2020-08-19 NOTE — H&P
[place]  [facility]    [address] [place] [phone]    Victoria fowler [phone]                           history & physical    Patient: Jin Rosado MRN: [id number]  SSN: [id number]    YOB: 1955  Age: 60 y.o. Sex: male          Date of Consultation:  September 22, 2017  Reason for Admission: PENIAL WARTS         History of Present Illness:  Patient is a 60 y.o. male admitted 9/22/2017 to the hospital for PENIAL WARTS. He has extensive lesions which have been responsive to CO2 laser tx in the past.      Past Medical History:  No Known Allergies   Prior to Admission medications    Medication Sig Start Date End Date Taking? Authorizing Provider   amLODIPine (NORVASC) 5 mg tablet Take 5 mg by mouth every morning. Yes Historical Provider   insulin detemir (LEVEMIR FLEXTOUCH) 100 unit/mL (3 mL) inpn 60 Units by SubCUTAneous route nightly. Yes Historical Provider   rosuvastatin (CRESTOR) 20 mg tablet Take 20 mg by mouth every morning. Yes Historical Provider   metoprolol tartrate (LOPRESSOR) 25 mg tablet Take 25 mg by mouth two (2) times a day. Yes Historical Provider   metFORMIN ER (GLUCOPHAGE XR) 500 mg tablet Take 500 mg by mouth two (2) times a day. Yes Historical Provider   exenatide microspheres (BYDUREON) 2 mg serr 2 mg by SubCUTAneous route every seven (7) days. Historical Provider   clopidogrel (PLAVIX) 75 mg tab Take 75 mg by mouth every morning. Historical Provider     PMHx:  has a past medical history of CAD (coronary artery disease) (02/2008); Diabetes ([facility] [address] Hypertension; and Sleep apnea. PSurgHx:  has a past surgical history that includes heart catheterization (02/2008) and urological (2005). .  PSocHx:  reports that he has been smoking. He has a 7.50 pack-year smoking history. He has never used smokeless tobacco. He reports that he does not drink alcohol or use illicit drugs.   ROS:  Admission ROS by Yaquelin Mehta MD from 9/22/2017 were reviewed with the patient and changes (other than per HPI) include: none.       Physical Exam:  General: WDWN  NAD   Skin: No Rash or Lesions   HEENT: Benign   Chest: Heart RRR    Lung CTA  No Exertion / Stridor / Audible Wheeze   Abdomen: S/NT/ND  No CVAT   Genitalia: Nl male  circ - extensive condyloma L shaft  Testes B/L descended without mass / tenderness  Nl cords  No hernia   Rectal: deferred   Extremities: FROM / No edema   Neurologic: Non-focal   A&O x 3   Other:        Labs:  Lab Results   Component Value Date/Time    Sodium 136 09/18/2017 08:52 AM    Potassium 4.6 09/18/2017 08:52 AM    Chloride 100 09/18/2017 08:52 AM    CO2 30 09/18/2017 08:52 AM    BUN 8 09/18/2017 08:52 AM    Creatinine 0.83 09/18/2017 08:52 AM    Glucose 118 09/18/2017 08:52 AM    Calcium 9.3 09/18/2017 08:52 AM       UA: No results found for: COLOR, APPRN, SPGRU, REFSG, SHANTELL, PROTU, GLUCU, KETU, BILU, UROU, SARAH, LEUKU, GLUKE, EPSU, BACTU, WBCU, RBCU, CASTS, UCRY    Cultures: n/a  Xrays: n/a    Assessment/Plan:     Penile warts   - for CO2 laser vaporization  - pt understands the P/P/R/B in detail       Signed By: Maci Wheeler MD  - September 22, 2017 No

## (undated) DEVICE — INFECTION CONTROL KIT SYS

## (undated) DEVICE — 3000CC GUARDIAN II: Brand: GUARDIAN

## (undated) DEVICE — CATH KT URETH INTMIT 15FR LTX -- CONVERT TO ITEM 363176

## (undated) DEVICE — COVER LT HNDL BLU PLAS

## (undated) DEVICE — TOWEL,OR,DSP,ST,BLUE,STD,2/PK,40PK/CS: Brand: MEDLINE

## (undated) DEVICE — MEDI-VAC NON-CONDUCTIVE SUCTION TUBING: Brand: CARDINAL HEALTH

## (undated) DEVICE — STRETCH BANDAGE ROLL: Brand: DERMACEA

## (undated) DEVICE — GOWN,SIRUS,FABRNF,XL,20/CS: Brand: MEDLINE

## (undated) DEVICE — GAUZE SPONGES,12 PLY: Brand: CURITY

## (undated) DEVICE — DRESSING PETRO GZ XRFRM CURAD ST OVERWRAP 5 X 9 IN

## (undated) DEVICE — STERILE POLYISOPRENE POWDER-FREE SURGICAL GLOVES: Brand: PROTEXIS

## (undated) DEVICE — SOLUTION IRRIG 1000ML H2O STRL BLT

## (undated) DEVICE — DEVON™ KNEE AND BODY STRAP 60" X 3" (1.5 M X 7.6 CM): Brand: DEVON

## (undated) DEVICE — BLADE TNGE REG 6IN WOOD STRL -- CONVERT TO ITEM 153408